# Patient Record
Sex: FEMALE | Race: WHITE | NOT HISPANIC OR LATINO | Employment: PART TIME | URBAN - METROPOLITAN AREA
[De-identification: names, ages, dates, MRNs, and addresses within clinical notes are randomized per-mention and may not be internally consistent; named-entity substitution may affect disease eponyms.]

---

## 2023-08-18 ENCOUNTER — OFFICE VISIT (OUTPATIENT)
Dept: URGENT CARE | Facility: URGENT CARE | Age: 48
End: 2023-08-18
Payer: COMMERCIAL

## 2023-08-18 VITALS
WEIGHT: 141.3 LBS | TEMPERATURE: 97.4 F | HEART RATE: 64 BPM | DIASTOLIC BLOOD PRESSURE: 81 MMHG | OXYGEN SATURATION: 99 % | SYSTOLIC BLOOD PRESSURE: 124 MMHG

## 2023-08-18 DIAGNOSIS — H02.849 SWELLING OF EYELID, UNSPECIFIED LATERALITY: Primary | ICD-10-CM

## 2023-08-18 PROCEDURE — 99203 OFFICE O/P NEW LOW 30 MIN: CPT | Performed by: PHYSICIAN ASSISTANT

## 2023-08-18 RX ORDER — FLUTICASONE PROPIONATE AND SALMETEROL 232; 14 UG/1; UG/1
POWDER, METERED RESPIRATORY (INHALATION)
COMMUNITY
End: 2024-07-23

## 2023-08-18 RX ORDER — EPINEPHRINE 0.3 MG/.3ML
INJECTION SUBCUTANEOUS
COMMUNITY
End: 2024-07-23

## 2023-08-18 RX ORDER — GABAPENTIN 300 MG/1
2 CAPSULE ORAL AT BEDTIME
COMMUNITY
Start: 2023-04-04

## 2023-08-18 RX ORDER — CETIRIZINE HYDROCHLORIDE 10 MG/1
TABLET ORAL
COMMUNITY
Start: 2022-07-22

## 2023-08-18 RX ORDER — ALBUTEROL SULFATE 90 UG/1
AEROSOL, METERED RESPIRATORY (INHALATION)
COMMUNITY

## 2023-08-18 RX ORDER — DEXAMETHASONE SODIUM PHOSPHATE 4 MG/ML
10 VIAL (ML) INJECTION ONCE
Status: COMPLETED | OUTPATIENT
Start: 2023-08-18 | End: 2023-08-18

## 2023-08-18 RX ORDER — MONTELUKAST SODIUM 4 MG/500MG
GRANULE ORAL
COMMUNITY

## 2023-08-18 RX ORDER — MECLIZINE HYDROCHLORIDE 25 MG/1
25 TABLET ORAL
COMMUNITY
Start: 2022-12-06 | End: 2024-07-23

## 2023-08-18 RX ORDER — ESCITALOPRAM OXALATE 5 MG/1
5 TABLET ORAL EVERY MORNING
COMMUNITY
End: 2024-07-23

## 2023-08-18 RX ORDER — MAGNESIUM OXIDE 400 MG/1
400 TABLET ORAL DAILY
COMMUNITY

## 2023-08-18 RX ORDER — MONTELUKAST SODIUM 10 MG/1
1 TABLET ORAL AT BEDTIME
COMMUNITY
End: 2024-07-23

## 2023-08-18 RX ORDER — FLUTICASONE PROPIONATE 220 UG/1
AEROSOL, METERED RESPIRATORY (INHALATION)
COMMUNITY
Start: 2022-04-27 | End: 2024-07-23

## 2023-08-18 RX ORDER — OLOPATADINE HYDROCHLORIDE 665 UG/1
SPRAY NASAL
COMMUNITY
Start: 2022-03-16

## 2023-08-18 RX ORDER — DOXYCYCLINE HYCLATE 100 MG
TABLET ORAL
COMMUNITY
Start: 2023-02-09 | End: 2024-07-23

## 2023-08-18 RX ORDER — ACETAMINOPHEN AND CODEINE PHOSPHATE 120; 12 MG/5ML; MG/5ML
0.35 SOLUTION ORAL
COMMUNITY
Start: 2022-08-23

## 2023-08-18 RX ORDER — FLUOCINOLONE ACETONIDE 0.25 MG/G
OINTMENT TOPICAL
COMMUNITY
Start: 2023-05-08 | End: 2024-07-23

## 2023-08-18 RX ORDER — PREDNISONE 20 MG/1
1 TABLET ORAL DAILY
COMMUNITY
Start: 2022-07-22

## 2023-08-18 RX ORDER — FAMOTIDINE 40 MG/1
40 TABLET, FILM COATED ORAL 2 TIMES DAILY
COMMUNITY
End: 2024-07-23

## 2023-08-18 RX ORDER — HYDROXYZINE HYDROCHLORIDE 25 MG/1
TABLET, FILM COATED ORAL
COMMUNITY
End: 2024-07-23

## 2023-08-18 RX ORDER — CLOBETASOL PROPIONATE 0.5 MG/G
OINTMENT TOPICAL
COMMUNITY
End: 2024-07-23

## 2023-08-18 RX ADMIN — Medication 10 MG: at 12:19

## 2023-08-18 ASSESSMENT — ENCOUNTER SYMPTOMS
MYALGIAS: 0
WOUND: 0
EYES NEGATIVE: 1
BACK PAIN: 0
SHORTNESS OF BREATH: 0
LIGHT-HEADEDNESS: 0
EYE PAIN: 0
CHILLS: 0
RESPIRATORY NEGATIVE: 1
DIARRHEA: 0
DIZZINESS: 0
ENDOCRINE NEGATIVE: 1
EYE REDNESS: 0
EYE DISCHARGE: 0
VOMITING: 0
MUSCULOSKELETAL NEGATIVE: 1
WEAKNESS: 0
COUGH: 0
ARTHRALGIAS: 0
PHOTOPHOBIA: 0
CARDIOVASCULAR NEGATIVE: 1
HEADACHES: 0
NAUSEA: 0
ALLERGIC/IMMUNOLOGIC NEGATIVE: 1
JOINT SWELLING: 0
NECK PAIN: 0
NECK STIFFNESS: 0
PALPITATIONS: 0
FEVER: 0
EYE ITCHING: 0
RHINORRHEA: 0
SORE THROAT: 0

## 2023-08-18 NOTE — PROGRESS NOTES
Chief Complaint:      Chief Complaint   Patient presents with    Eye Swelling     Pt c/o of eye swelling in both eyes noticed it yesterday, painful to blink eyes. Pt has not used anything for pain       Medical Decision Making:    Differential Diagnosis:  Eye Problem: Stye (external)  Stye (internal)  Blepharitis  Allergic reaction     ASSESSMENT     1. Swelling of eyelid, unspecified laterality         PLAN    No indication of pink eye at this time.  Patient given 10 Mg Decadron.  Artifical tears for irritation  Warm compresses with baby shampoo for mattering.   Worrisome symptoms discussed with instructions to go to the ED.  Patient verbalized understanding and agreed with this plan.    Labs:    No results found for any visits on 08/18/23.       Current Meds    Current Outpatient Medications:     albuterol (PROAIR HFA/PROVENTIL HFA/VENTOLIN HFA) 108 (90 Base) MCG/ACT inhaler, INHALE 2 PUFFS BY MOUTH 4 TIMES A DAY AS NEEDED, Disp: , Rfl:     alcaftadine (LASTACAFT) 0.25 % ophthalmic solution, 1-2 drops each eye once or twice daily, Disp: , Rfl:     cetirizine (ZYRTEC) 10 MG tablet, 2-6 PER DAY AS NEEDED FOR ALLERGIES., Disp: , Rfl:     clobetasol (TEMOVATE) 0.05 % external ointment, APPLY A SMALL AMOUNT TO THE AFFECTED AREAS TWICE DAILY AS NEEDED, Disp: , Rfl:     doxycycline hyclate (VIBRA-TABS) 100 MG tablet, , Disp: , Rfl:     EPINEPHrine (ANY BX GENERIC EQUIV) 0.3 MG/0.3ML injection 2-pack, INJECT INTO THE LATERAL THIGH IF NEEDED FOR LIFE THREATENING ALLERGIC REACTIONS., Disp: , Rfl:     escitalopram (LEXAPRO) 5 MG tablet, Take 5 mg by mouth every morning, Disp: , Rfl:     famotidine (PEPCID) 40 MG tablet, Take 40 mg by mouth 2 times daily, Disp: , Rfl:     fluocinolone (SYNALAR) 0.025 % ointment, Apply topically to affected area(s) two times daily. Use as needed., Disp: , Rfl:     fluticasone (FLOVENT HFA) 220 MCG/ACT inhaler, 2 puffs inhaled twice daily, Disp: , Rfl:     fluticasone-salmeterol (AIRDUO  RESPICLICK) 232-14 MCG/ACT inhaler, INHALE 1 EACH BY MOUTH 2 TIMES DAILY., Disp: , Rfl:     gabapentin (NEURONTIN) 300 MG capsule, Take 2 capsules by mouth At Bedtime, Disp: , Rfl:     hydrOXYzine (ATARAX) 25 MG tablet, TAKE 1/2 TO 2 TABLETS BY MOUTH EVERY 6 HOURS AS NEEDED FOR HIVES & ITCHING, Disp: , Rfl:     magnesium oxide 400 MG tablet, Take 400 mg by mouth daily, Disp: , Rfl:     meclizine (ANTIVERT) 25 MG tablet, Take 25 mg by mouth, Disp: , Rfl:     montelukast (SINGULAIR) 10 MG tablet, Take 1 tablet by mouth At Bedtime, Disp: , Rfl:     montelukast sodium 4 MG PACK, , Disp: , Rfl:     norethindrone (MICRONOR) 0.35 MG tablet, Take 0.35 mg by mouth, Disp: , Rfl:     olopatadine (PATANASE) 0.6 % nasal spray, INHALE 1-2 PUFFS INTO AFFECTED NOSTRIL(S) 2 TIMES DAILY., Disp: , Rfl:     omeprazole (PRILOSEC) 20 MG DR capsule, Take 20 mg by mouth, Disp: , Rfl:     predniSONE (DELTASONE) 20 MG tablet, Take 1 tablet by mouth daily, Disp: , Rfl:     Vitamin D3 (CHOLECALCIFEROL) 125 MCG (5000 UT) tablet, TAKE 1 TABLET (5,000 UNITS) BY MOUTH ONCE DAILY., Disp: , Rfl:     Current Facility-Administered Medications:     dexAMETHasone (DECADRON) injectable solution used ORALLY 10 mg, 10 mg, Oral, Once, Jim Godwin PA-C    Allergies  Allergies   Allergen Reactions    Azithromycin Other (See Comments)     Stomach cramps    Stomach cramps   Stomach cramping an pain         SUBJECTIVE    HPI: Radha Orellana is a 47 year old female presenting with both eyes upper eyelid swelling  for the past 1 days.  There has not been exposure to pink eye.  There has not been trauma to the eye.  Radha Orellana does not wear contact lenses.    No problems with vision, or eye pain.     No recent viral illness or seasonal allergies.    Patient is new to [facility].    ROS:     Review of Systems   Constitutional:  Negative for chills and fever.   HENT:  Negative for congestion, ear pain, rhinorrhea and sore throat.     Eyes: Negative.  Negative for photophobia, pain, discharge, redness, itching and visual disturbance.        Eyelid Swelling   Respiratory: Negative.  Negative for cough and shortness of breath.    Cardiovascular: Negative.  Negative for chest pain and palpitations.   Gastrointestinal:  Negative for diarrhea, nausea and vomiting.   Endocrine: Negative.    Genitourinary: Negative.    Musculoskeletal: Negative.  Negative for arthralgias, back pain, joint swelling, myalgias, neck pain and neck stiffness.   Skin: Negative.  Negative for rash and wound.   Allergic/Immunologic: Negative.  Negative for immunocompromised state.   Neurological:  Negative for dizziness, weakness, light-headedness and headaches.           Family History   No family history on file.     Problem history  There is no problem list on file for this patient.          Social History  Social History     Socioeconomic History    Marital status: Single     Spouse name: Not on file    Number of children: Not on file    Years of education: Not on file    Highest education level: Not on file   Occupational History    Not on file   Tobacco Use    Smoking status: Never    Smokeless tobacco: Never   Substance and Sexual Activity    Alcohol use: Yes     Comment: very occ    Drug use: Never    Sexual activity: Not on file   Other Topics Concern    Not on file   Social History Narrative    Not on file     Social Determinants of Health     Financial Resource Strain: Not on file   Food Insecurity: Not on file   Transportation Needs: Not on file   Physical Activity: Not on file   Stress: Not on file   Social Connections: Not on file   Intimate Partner Violence: Not on file   Housing Stability: Not on file        OBJECTIVE     Vital signs reviewed by Jim Godwin PA-C  /81 (BP Location: Left arm, Patient Position: Sitting, Cuff Size: Adult Regular)   Pulse 64   Temp 97.4  F (36.3  C) (Tympanic)   Wt 64.1 kg (141 lb 4.8 oz)   SpO2 99%      Physical  Exam  Vitals and nursing note reviewed.   Constitutional:       General: She is not in acute distress.     Appearance: She is well-developed. She is not ill-appearing, toxic-appearing or diaphoretic.   HENT:      Head: Normocephalic and atraumatic.      Right Ear: Hearing, tympanic membrane, ear canal and external ear normal. Tympanic membrane is not perforated, erythematous, retracted or bulging.      Left Ear: Hearing, tympanic membrane, ear canal and external ear normal. Tympanic membrane is not perforated, erythematous, retracted or bulging.      Nose: Congestion and rhinorrhea present. No mucosal edema.      Right Sinus: No maxillary sinus tenderness or frontal sinus tenderness.      Left Sinus: No maxillary sinus tenderness or frontal sinus tenderness.      Mouth/Throat:      Pharynx: No pharyngeal swelling, oropharyngeal exudate, posterior oropharyngeal erythema or uvula swelling.      Tonsils: No tonsillar exudate or tonsillar abscesses. 0 on the right. 0 on the left.   Eyes:      General:         Right eye: No discharge.         Left eye: No discharge.      Pupils: Pupils are equal, round, and reactive to light.      Comments: Mild edema of Bilateral upper eyelids.   Cardiovascular:      Rate and Rhythm: Normal rate and regular rhythm.      Heart sounds: Normal heart sounds. No murmur heard.     No friction rub. No gallop.   Pulmonary:      Effort: Pulmonary effort is normal. No respiratory distress.      Breath sounds: Normal breath sounds. No decreased breath sounds, wheezing, rhonchi or rales.   Chest:      Chest wall: No tenderness.   Abdominal:      General: Bowel sounds are normal. There is no distension.      Palpations: Abdomen is soft. There is no mass.      Tenderness: There is no abdominal tenderness. There is no guarding.   Musculoskeletal:      Cervical back: Normal range of motion and neck supple.   Lymphadenopathy:      Head:      Right side of head: No submental, submandibular, tonsillar,  preauricular or posterior auricular adenopathy.      Left side of head: No submental, submandibular, tonsillar, preauricular or posterior auricular adenopathy.      Cervical: No cervical adenopathy.      Right cervical: No superficial or posterior cervical adenopathy.     Left cervical: No superficial or posterior cervical adenopathy.   Skin:     General: Skin is warm and dry.      Findings: No rash.   Neurological:      Mental Status: She is alert and oriented to person, place, and time.      Cranial Nerves: No cranial nerve deficit.      Deep Tendon Reflexes: Reflexes are normal and symmetric.   Psychiatric:         Behavior: Behavior normal. Behavior is cooperative.         Thought Content: Thought content normal.         Judgment: Judgment normal.            Jim Godwin PA-C  8/18/2023, 11:44 AM

## 2024-07-23 ENCOUNTER — ANCILLARY PROCEDURE (OUTPATIENT)
Dept: GENERAL RADIOLOGY | Facility: CLINIC | Age: 49
End: 2024-07-23
Attending: INTERNAL MEDICINE

## 2024-07-23 ENCOUNTER — OFFICE VISIT (OUTPATIENT)
Dept: URGENT CARE | Facility: URGENT CARE | Age: 49
End: 2024-07-23

## 2024-07-23 VITALS
SYSTOLIC BLOOD PRESSURE: 136 MMHG | DIASTOLIC BLOOD PRESSURE: 85 MMHG | HEART RATE: 66 BPM | OXYGEN SATURATION: 99 % | TEMPERATURE: 97.5 F | RESPIRATION RATE: 18 BRPM | WEIGHT: 143.9 LBS

## 2024-07-23 DIAGNOSIS — R05.9 COUGH, UNSPECIFIED TYPE: ICD-10-CM

## 2024-07-23 DIAGNOSIS — R05.9 COUGH, UNSPECIFIED TYPE: Primary | ICD-10-CM

## 2024-07-23 DIAGNOSIS — J01.90 ACUTE SINUSITIS WITH SYMPTOMS > 10 DAYS: ICD-10-CM

## 2024-07-23 PROCEDURE — 99214 OFFICE O/P EST MOD 30 MIN: CPT | Performed by: INTERNAL MEDICINE

## 2024-07-23 PROCEDURE — 71046 X-RAY EXAM CHEST 2 VIEWS: CPT | Mod: TC | Performed by: RADIOLOGY

## 2024-07-23 RX ORDER — PREDNISONE 20 MG/1
60 TABLET ORAL DAILY
Qty: 15 TABLET | Refills: 0 | Status: SHIPPED | OUTPATIENT
Start: 2024-07-23 | End: 2024-07-28

## 2024-07-23 NOTE — LETTER
July 23, 2024      Radha Orellana  [address] [place] [address]        To Whom It May Concern:    Radha Orellana was seen in our clinic for an illness.  She missed work on 7/23/24 due to illness.  She may need to miss an additional two days as well.  When her symptoms have improved, she may return to work without restrictions.      Sincerely,        Sandra Dailey MD

## 2024-07-23 NOTE — PROGRESS NOTES
SUBJECTIVE:  Radha Orellana is an 48 year old female who presents for cough.  mostly dry.  Occasionally a little yellow sputum.  Cough for a couple weeks.  A little shortness of breath the past couple days.  Feels tired.  Using ventolin inhaler which helps for about 20 minutes.  Sometimes feels wheezy.  Nasal congestion for three weeks and sinus headache.  Pain over maxillary region.  No tooth pain.  No fevers.  No n/v/d.  No skin rashes.  No recent travel.  No known exposures but works around people.  Took some amoxicillin from [place] that a friend gave her but didn't help.  Feels her asthma is acting up with this illness.    PMH:  Asthma, environmental allergies, restless leg syndrome.  There is no problem list on file for this patient.    Social History     Socioeconomic History    Marital status: Single     Spouse name: None    Number of children: None    Years of education: None    Highest education level: None   Tobacco Use    Smoking status: Some Days     Types: Cigarettes    Smokeless tobacco: Never   Substance and Sexual Activity    Alcohol use: Yes     Comment: very occ    Drug use: Never     Social Determinants of Health      [facility] [employer] [facility]    Financial Resource Strain    Received from [employer] [facility] [employer]    Social Connections     No family history on file.    ALLERGIES:  Azithromycin    MEDICATIONS:  Albuterol inhaler, gabapenitin, magnesium        ROS:  ROS is done and is negative for general/constitutional, eye, ENT, Respiratory, cardiovascular, GI, , Skin, musculoskeletal except as noted elsewhere.  All other review of systems negative except as noted elsewhere.      OBJECTIVE:  /85 (BP Location: Left arm, Patient Position: Sitting, Cuff Size: Adult Regular)   Pulse 66   Temp 97.5  F (36.4  C) (Tympanic)   Resp 18   Wt 65.3  kg (143 lb 14.4 oz)   SpO2 99%   GENERAL APPEARANCE: Alert, in no acute distress  EYES: normal  EARS: External ears normal. Canals clear. TMs with mild clear effusions, no erythema bilaterally.  NOSE:mildly inflamed mucosa  OROPHARYNX:normal  SINUSES: tender over frontal   NECK:No adenopathy,masses or thyromegaly  RESP: frequent cough, clear to auscultation  CV:regular rate and rhythm and no murmurs, clicks, or gallops  ABDOMEN: Abdomen soft, non-tender. BS normal. No masses, organomegaly  SKIN: no ulcers, lesions or rash  MUSCULOSKELETAL:Musculoskeletal normal      RESULTS  CXR: no infiltrates or effusions noted on my reading.  Recent Results (from the past 48 hour(s))   XR Chest 2 Views    Narrative    CHEST 2 VIEWS   7/23/2024 11:52 AM     HISTORY: Cough for 3 weeks; Cough, unspecified type.    COMPARISON: None.      Impression    IMPRESSION: No acute cardiopulmonary disease.       ASSESSMENT/PLAN:    ASSESSMENT / PLAN:  (R05.9) Cough, unspecified type  (primary encounter diagnosis)  Comment: given length of sxs, will cover for bacterial etiology with augmentin, which will also treat her sinusitis.  As has asthma which has been exacerbated by current illness, will also rx prednisone.  Continue albuterol inhaler prn q4 hr.  Plan: XR Chest 2 Views, amoxicillin-clavulanate         (AUGMENTIN) 875-125 MG tablet, predniSONE         (DELTASONE) 20 MG tablet        Reviewed medication instructions and side effects. Follow up if experiences side effects.. I reviewed supportive care, otc meds to use if needed, expected course, and signs of concern.  Follow up as needed or if does not improve within 1 week(s) or if worsens in any way.  Reviewed red flag symptoms and is to go to the ER if experiences any of these.    (J01.90) Acute sinusitis with symptoms > 10 days  Comment:   Plan: amoxicillin-clavulanate (AUGMENTIN) 875-125 MG         tablet        Reviewed medication instructions and side effects. Follow up if  experiences side effects.. I reviewed supportive care, otc meds to use if needed, expected course, and signs of concern.  Follow up as needed or if does not improve within 1 week(s) or if worsens in any way.  Reviewed red flag symptoms and is to go to the ER if experiences any of these.    See [facility] for orders, medications, letters, patient instructions    Sandra Dailey M.D.